# Patient Record
Sex: MALE | Race: BLACK OR AFRICAN AMERICAN
[De-identification: names, ages, dates, MRNs, and addresses within clinical notes are randomized per-mention and may not be internally consistent; named-entity substitution may affect disease eponyms.]

---

## 2018-10-24 NOTE — GHP
DATE OF ADMISSION:  10/25/2018



ADMISSION DIAGNOSIS:  Adenocarcinoma of the prostate.



HISTORY OF PRESENT ILLNESS:  This is a 58-year-old gentleman who was referred to [facility] by Lai Cox.  He has had general urinary tract symptoms with nocturia which has been present for several
 months, and he had a PSA that was 8.3 as of July 19, 2018, and he had had an original AUA score of 9
, postvoid residual of 0, and he has a Corona score 7 prostate cancer with a PSA density of 0.12 and
 prostate volume 45 g by ultrasound.  He has been informed of the diagnosis, stage, grade, and option
s of treatment, and he has elected to undergo a robotic-assisted radical prostatectomy with pelvic ly
mph node dissection as noted.  His pathology revealed that he had a Huy score 7, WHO grade group 
2.  The maximum tumor size was in the left transition zone at 40% involving 1 of the 2 cores and the 
pattern 4 involved 20% of the core.



PAST MEDICAL HISTORY:  Elevated PSA, hypertension.



PAST SURGERIES:  Tendon surgery and the ultrasound with biopsy.



MEDICATIONS:  Lisinopril.



ALLERGIES:  None.



FAMILY HISTORY:  Hypertension, colon cancer, and diabetes.



SOCIAL HISTORY:  Moderate alcohol consumption.  Nonsmoker.



REVIEW OF SYSTEMS:  Negative cardiac, respiratory, GI, and endocrine.



PHYSICAL EXAMINATION:  VITAL SIGNS:  Stable.  CHEST:  Clear.  HEART:  Regular rate and rhythm.  ABDOM
EN:  Normal.  No organomegaly, rebound, or guarding.  LOWER EXTREMITIES:  Normal.  RECTAL:  He had ha
d a rectal exam prior to the evaluation that showed he had a firm prostate, but no specific nodules. 
 At the present time, he is admitted for the bilateral pelvic lymphadenectomy and radical retropubic 
prostatectomy with lymph node dissection.





Job #:  [id number]

## 2018-10-25 ENCOUNTER — HOSPITAL ENCOUNTER (INPATIENT)
Dept: HOSPITAL 80 - F3E | Age: 58
LOS: 1 days | Discharge: HOME | DRG: 708 | End: 2018-10-26
Attending: SPECIALIST | Admitting: SPECIALIST
Payer: COMMERCIAL

## 2018-10-25 DIAGNOSIS — I10: ICD-10-CM

## 2018-10-25 DIAGNOSIS — C61: Primary | ICD-10-CM

## 2018-10-25 PROCEDURE — 0VBQ4ZZ EXCISION OF BILATERAL VAS DEFERENS, PERCUTANEOUS ENDOSCOPIC APPROACH: ICD-10-PCS | Performed by: SPECIALIST

## 2018-10-25 PROCEDURE — 0VT34ZZ RESECTION OF BILATERAL SEMINAL VESICLES, PERCUTANEOUS ENDOSCOPIC APPROACH: ICD-10-PCS | Performed by: SPECIALIST

## 2018-10-25 PROCEDURE — 0VT04ZZ RESECTION OF PROSTATE, PERCUTANEOUS ENDOSCOPIC APPROACH: ICD-10-PCS | Performed by: SPECIALIST

## 2018-10-25 PROCEDURE — 8E0W4CZ ROBOTIC ASSISTED PROCEDURE OF TRUNK REGION, PERCUTANEOUS ENDOSCOPIC APPROACH: ICD-10-PCS | Performed by: SPECIALIST

## 2018-10-25 RX ADMIN — Medication PRN MCG: at 12:21

## 2018-10-25 RX ADMIN — ACETAMINOPHEN PRN MG: 325 TABLET ORAL at 19:55

## 2018-10-25 RX ADMIN — Medication PRN MCG: at 12:12

## 2018-10-25 RX ADMIN — SODIUM CHLORIDE, SODIUM LACTATE, POTASSIUM CHLORIDE, CALCIUM CHLORIDE, AND DEXTROSE MONOHYDRATE SCH MLS: 600; 310; 30; 20; 5 INJECTION, SOLUTION INTRAVENOUS at 14:00

## 2018-10-25 RX ADMIN — HYDROMORPHONE HYDROCHLORIDE PRN MG: 2 INJECTION INTRAMUSCULAR; INTRAVENOUS; SUBCUTANEOUS at 12:30

## 2018-10-25 RX ADMIN — HYDROMORPHONE HYDROCHLORIDE PRN MG: 2 INJECTION INTRAMUSCULAR; INTRAVENOUS; SUBCUTANEOUS at 12:48

## 2018-10-25 NOTE — SOAPPROG
SOAP Progress Note


Assessment/Plan: 


Assessment: Prostate cancer   Acute   PO check, doing well, cath irrigated and 

no problems, give fluid bolus





Plan: as noted





10/25/18 16:54





Subjective: 





doing well, bladder spasms


Objective: 





 Vital Signs











Temp Pulse Resp BP Pulse Ox


 


 37.2 C   61   16   116/69   96 


 


 10/25/18 16:49  10/25/18 16:49  10/25/18 16:49  10/25/18 16:49  10/25/18 16:49








 











 10/24/18 10/25/18 10/26/18





 05:59 05:59 05:59


 


Intake Total   660


 


Output Total   250


 


Balance   410














Physical Exam





- Physical Exam


General Appearance: alert


Respiratory: No respiratory distress


Cardiac/Chest: regular rate, rhythm


Abdomen: soft


Male Genitalia: other (irrigated cath and draining well)


Neuro/Psych: alert, oriented x 3





ICD10 Worksheet


Patient Problems: 


 Problems











Problem Status Onset


 


Prostate cancer Acute

## 2018-10-25 NOTE — GOP
DATE OF OPERATION:  



SURGEON:  Rudy Boland MD



ASSISTANT:  Brielle Og CFA.



ANESTHESIA:  General anesthesia.



ANESTHESIOLOGIST:  Sudhir Zamora MD.



PREOPERATIVE DIAGNOSIS:  Prostate cancer.



POSTOPERATIVE DIAGNOSIS:  Prostate cancer.



PROCEDURE PERFORMED:  Radical retropubic prostatectomy, robotically assisted, bilateral pelvic lymph 
node dissection.



FINDINGS:  





DESCRIPTION OF PROCEDURE:  After undergoing general anesthesia appropriately placed for the robotic p
rocedure, all sites padded.  Appropriate time-out, prepped and draped in normal sterile fashion.  A c
atheter placed atraumatically and then a Veress needle was placed in the supraumbilical site, inflate
d to 15 mmHg pressure with CO2, and then at that point, strategically placed the camera port under di
rect vision and the 3 robot arm ports, and the assistant port.  At that point, the robot was docked. 
 It was initiated by taking down the adhesions of the sigmoid colon that were in the pelvis and the l
eft iliac fossa.  Then I was able to bring the colon out of the pelvis and identify the vas deferens 
bilaterally and carried the dissection of the peritoneum over those.  This was carried down to where 
they went into the prostate and dissection anterior and posterior to the ampulla vas deferens, the se
tacos vesicles.  Hemostasis provided with Hem-o-loks and electrocautery bipolar.  



 the space between the rectum and the prostate and then the vas deferens was then divided an
d lateral dissection was made.  I dropped the bladder back down at that point after taking down the u
rachus and the obliterated umbilical vessels and then identified the endopelvic fascia on the right a
nd left sides and incised that.  Took the puboprostatic ligaments down sharply.  The deep dorsal vein
 was ligated with two #0 Vicryl sutures.  Focused attention to the bladder neck which was incised and
 attempted to preserve the circular fibers of the bladder neck through the dissection.  After enterin
g the bladder anteriorly, identified the bladder neck and incised the posterior bladder neck.  Was ab
le to do that dissection between the base of the prostate and the base of the bladder.  Brought the s
eminal vesicles, ampulla vas deferens anterior to the bladder neck, and then on the left neurovascula
r bundle made a significant attempt to preserve that structure.  Hem-o-loks were used for hemostasis 
and that was carried down to the lateral side of the urethra.  Then on the right side, similar issues
 and attempts to preserve the neurovascular bundle.  At the apex on the right side, he had a bit more
 inflammation and grossly it appeared that there was no incision of the prostatic capsule and no danny
s malignancy beyond the capsule.  



At that point, I cut the anterior urethra and then the posterior urethra and at the urethra, it appea
red that margin was clear.  Then the Floyd stitch and anastomosis was performed with a running 3-0 V-
loc.  It was bridged with a 20-Lithuanian catheter and it irrigated well and clear.  The lymph node disse
ction was carried out using the margins of the mid aspect of the external iliac vein, obturator nerve
 in the depth, and it was carried up to the bifurcation of the iliac vessels.  Hem-o-loks were used t
o provide hemostasis.  At the end, let the pressure down to 5 mmHg and there was no welling or bleedi
ng.  There was no staining with respect to the bowel's appearance.  The specimens were bagged and emelyn
gennaro and then used Evicel over the anastomotic site area.  A REJI drain was placed to drain the anastomo
sis, brought out through the left 8 mm robot port, and then after undocking the robot, the assistant 
port was closed with a fascial closure device, 0 Vicryl, and then we extended the abdominal incision 
to the point from right to left, so we could retract the bag containing the prostate specimen.  Then 
the fascial layer was approximated with a running 0 Vicryl and it was hemostatic.  The subcutaneous t
issue was hemostatic.  The skin was approximated at all sites with 4-0 Monocryl and drain was sewn in
 place with 3-0 silk.  He tolerated the procedure well.  He will be admitted for postoperative care. 
 I will discuss the issues with his family and specimen sent for Pathology.  After estimated blood lo
ss was on the lines of 50 mL per Anesthesia.





Job #:  [id number]

## 2018-10-25 NOTE — POSTOPPROG
Post Op Note


Date of Operation: 10/25/18 (dictated)


Surgeon: Rudy Boland


Assistant: Lenka


Anesthesiologist: Sera


Anesthesia: GET(General Endotracheal)


Pre-op Diagnosis: prostate cancer


Procedure: RA-RRP and PLND


Inf/Abcess present in the surg proc area at time of surgery?: No


EBL: 


Drains: Keith Renteria, Other (hitchcock)


Specimen(s): 





sent

## 2018-10-25 NOTE — ASMTCMCOM
CM Note

 

CM Note                       

Notes:

Patient is POD #0 RA radical prostatectomy w pelvic lymph node dissection. He is stable and doing 

well.



Patient is normally independent and lives with his wife. I do not anticipate any discharge needs. 

 

Date Signed:  10/25/2018 03:26 PM

Electronically Signed By:Judith Melendrez RN

## 2018-10-25 NOTE — PDANEPAE
ANE History of Present Illness


PCA here for robotic prostatectomy








ANE Past Medical History





- Cardiovascular History


Hx Hypertension: Yes


Hx Arrhythmias: No


Hx Chest Pain: No


Hx Coronary Artery / Peripheral Vascular Disease: No


Hx CHF / Valvular Disease: No


Hx Palpitations: No





- Pulmonary History


Hx COPD: No


Hx Asthma/Reactive Airway Disease: No


Hx Recent Upper Respiratory Infection: No


Hx Oxygen in Use at Home: No


Hx Sleep Apnea: No


Sleep Apnea Screening Result - Last Documented: Positive


Pulmonary History Comment: BOGDAN triggers only





- Neurologic History


Hx Cerebrovascular Accident: No


Hx Seizures: No


Hx Dementia: No





- Endocrine History


Hx Diabetes: No





- Renal History


Hx Renal Disorders: No





- Liver History


Hx Hepatic Disorders: No





- Neurological & Psychiatric Hx


Hx Neurological and Psychiatric Disorders: No





- Cancer History


Hx Cancer: Yes


Cancer History Comment: prostate CA





- Congenital Disorder History


Hx Congenital Disorders: No





- GI History


Hx Gastrointestinal Disorders: No





- Other Health History


Other Health History: none





- Chronic Pain History


Chronic Pain: No





- Surgical History


Prior Surgeries: torn ligamets/tendons repaired in right thumb, 1985





ANE Review of Systems


Review of Systems: 








- Exercise capacity


METS (RN): 4 METS





ANE Patient History





- Allergies


Allergies/Adverse Reactions: 








No Known Allergies Allergy (Verified 10/11/18 15:05)


 








- Home Medications


Home Medications: 








Lisinopril [Zestril 10 mg (*)] 10 mg PO DAILY 10/05/18 [Last Taken 10/24/18 19:

00]








- NPO status


NPO Status: no food or drink >8 hours





- Anes Hx


Anes Hx: no prior problems





- Smoking Hx


Smoking Status: Never smoked





- Alcohol Use


Alcohol Use: Rarely





- Family Anes Hx


Family Anes Hx: none


Family Hx Anesthesia Complications: none





ANE Labs/Vital Signs





- Vital Signs


Height: 182.88 cm


Weight: 81.647 kg





ANE Physical Exam





- Airway


Neck exam: FROM


Mallampati Score: Class 2


Mouth exam: normal dental/mouth exam





- Pulmonary


Pulmonary: no respiratory distress, clear to auscultation





- Cardiovascular


Cardiovascular: regular rate and rhythym, no murmur, rub, or gallop





- ASA Status


ASA Status: II





ANE Anesthesia Plan


Anesthesia Plan: general endotracheal anesthesia

## 2018-10-26 VITALS — SYSTOLIC BLOOD PRESSURE: 112 MMHG | DIASTOLIC BLOOD PRESSURE: 70 MMHG

## 2018-10-26 LAB — PLATELET # BLD: 255 10^3/UL (ref 150–400)

## 2018-10-26 RX ADMIN — ACETAMINOPHEN PRN MG: 325 TABLET ORAL at 06:07

## 2018-10-26 RX ADMIN — SODIUM CHLORIDE, SODIUM LACTATE, POTASSIUM CHLORIDE, CALCIUM CHLORIDE, AND DEXTROSE MONOHYDRATE SCH MLS: 600; 310; 30; 20; 5 INJECTION, SOLUTION INTRAVENOUS at 06:08

## 2018-10-26 NOTE — PDMN
Medical Necessity


Medical necessity: Pt meets inpt criteria per MD order and [id number], 

Prostatectomy, Radical, auth # [id number], approved.  59 y/o admitted for radical 

prostatectomy and pelvic lymph node dissection,  post op day#1 low H&H 8.2, 23.3

, med nec ongoing monitoring and treatment.

## 2018-10-26 NOTE — SOAPPROG
SOAP Progress Note


Assessment/Plan: 


Assessment: Prostate cancer   Acute   POD#1  good UO, HCT normal, REJI out





Plan: DC home











10/26/18 13:18





Subjective: 





ok and desires DC


Objective: 





 Vital Signs











Temp Pulse Resp BP Pulse Ox


 


 37.5 C   74   16   125/76 H  97 


 


 10/26/18 04:37  10/26/18 04:37  10/26/18 04:37  10/26/18 04:37  10/26/18 04:37








 Laboratory Results





 10/26/18 05:05 





 10/26/18 05:05 





 











 10/25/18 10/26/18 10/27/18





 05:59 05:59 05:59


 


Intake Total  2714 1194


 


Output Total  3370 


 


Balance  -656 1194








new HCT >40%





Physical Exam





- Physical Exam


General Appearance: alert


Neck: supple


Respiratory: No respiratory distress


Cardiac/Chest: regular rate, rhythm


Abdomen: soft


Back: No CVA tenderness


Extremities: Lyndsay's sign, No calf tenderness


Neuro/Psych: alert, oriented x 3





ICD10 Worksheet


Patient Problems: 


 Problems











Problem Status Onset


 


Prostate cancer Acute

## 2018-10-28 ENCOUNTER — HOSPITAL ENCOUNTER (EMERGENCY)
Dept: HOSPITAL 80 - FED | Age: 58
Discharge: HOME | End: 2018-10-28
Payer: COMMERCIAL

## 2018-10-28 VITALS — DIASTOLIC BLOOD PRESSURE: 74 MMHG | SYSTOLIC BLOOD PRESSURE: 122 MMHG

## 2018-10-28 DIAGNOSIS — T83.038A: Primary | ICD-10-CM

## 2018-10-28 DIAGNOSIS — Z98.890: ICD-10-CM

## 2018-10-28 DIAGNOSIS — Z90.79: ICD-10-CM

## 2018-10-28 NOTE — EDPHY
H & P


Stated Complaint: Elmore leaking/possible pus in bag. Prostate surg 3 days ago


Time Seen by Provider: 10/28/18 09:16





- Personal History


Current Tetanus Diphtheria and Acellular Pertussis (TDAP): Yes





- Medical/Surgical History


Hx Diabetes: No


Other PMH: prostate CA w/DaVinci prostactectomy 10/18.  HTN





- Social History


Smoking Status: Never smoked


Constitutional: 


 Initial Vital Signs











Temperature (C)  37 C   10/28/18 09:10


 


Heart Rate  75   10/28/18 09:10


 


Respiratory Rate  16   10/28/18 09:10


 


Blood Pressure  122/74 H  10/28/18 09:10


 


O2 Sat (%)  97   10/28/18 09:10








 











O2 Delivery Mode               Room Air














Allergies/Adverse Reactions: 


 





No Known Allergies Allergy (Verified 10/28/18 09:10)


 








Home Medications: 














 Medication  Instructions  Recorded


 


Lisinopril [Zestril 10 mg (*)] 10 mg PO DAILY 10/05/18














Medical Decision Making


ED Course/Re-evaluation: 





CHIEF COMPLAINT: Catheter leakage





HISTORY OF PRESENT ILLNESS: The patient is a 59 y/o male who had a 

prostatectomy three days ago for prostate cancer who arrives with his wife 

complaining of urinary leakage and possible purulent discharge from his 

catheter onset yesterday. He feels like the leg bag may be on too low and is 

pulling on the catheter. This is most noticeable when sitting down. He says in 

general, "I feel great." He denies pain, fever, bleeding. Dr. Boland, urologist 

performed the procedure and he is scheduled to follow up with him in a week. 





REVIEW OF SYSTEMS:





A 10 point review of systems was performed and is negative with the exception 

of the elements mentioned in the history of present illness.





PHYSICAL EXAM:





HR, BP, O2 Sat, RR.  Temp noted


General Appearance: Alert, well hydrated, appropriate, and non-toxic appearing.


Head: Atraumatic without scalp tenderness or obvious injury


Eyes: Pupils equal, round, reactive to light and accommodation, EOMI, no trauma

, no injection.


Nose: Atraumatic, no rhinorrhea, clear.


Throat: Mucus membranes moist.


Neck: Supple,non-tender, no lymphadenopathy.


Respiratory: No retractions, no distress, no wheezes, and no accessory muscle 

use. Lungs are clear to auscultation bilaterally.


Cardiovascular: Regular rate and rhythm, no murmurs, rubs, or gallops. Good 

capillary refill all extremities.


Gastrointestinal: Abdomen is soft, non-tender, non-distended, no masses, no 

rebound, no guarding, no peritoneal signs.


G/U: Small quantity of blood-tinged urine leaking around catheter and in leg 

bag. Otherwise normal male genitalia. 


Musculoskeletal: Normal active ROM of all extremities, atraumatic.


Neurological: Alert, appropriate, and interactive. The patient has non-focal 

cranial nerves, motor, sensory, and cerebellar exam.


Skin: No rashes, good turgor, no nodules on palpation.





PAST MEDICAL HISTORY: Prostate cancer





PAST SURGICAL HISTORY: Prostatectomy 10/2018- Dr. Oren Boland





SOCIAL HISTORY: Wife at bedside. Lives in [place]. Employed. 





DIFFERENTIAL DIAGNOSIS: The differential diagnosis for the patient's symptoms 

included but was not limited to Elmore complication, prostatectomy side effect, 

medication side effect, neurologic causes, outflow obstruction, and infection.





MEDICAL DECISION MAKING:





This is a 59 y/o male who presents with mild urinary leakage around his Elmore 

catheter 3 days post prostatectomy. He has no pain or other symptoms. On 

inspection he has a small quantity of blood-tinged urine leaking around the 

catheter and blood-tinged urine in the leg bag, but otherwise has a normal 

exam. Plan to readjust leg bag and have him follow up with his urologist as 

needed. No indication for other interventions at this time. He is comfortable 

with this plan. Return precautions discussed. 





Departure





- Departure


Disposition: Home, Routine, Self-Care


Clinical Impression: 


 Elmore catheter in place





Condition: Good


Instructions:  Elmore Catheter Placement and Care (ED)


Additional Instructions: 


Follow up with Dr. Boland as scheduled. Return to the ED for severe pain, 

bleeding, inability to urinate, or other worsening of condition. 


Referrals: 


Keiry Cox MD [Primary Care Provider] - As per Instructions


Rudy Boland MD [Medical Doctor] - As per Instructions


Report Scribed for: Ant Hair


Report Scribed by: Linda Kohler


Date of Report: 10/28/18


Time of Report: 09:41